# Patient Record
Sex: FEMALE
[De-identification: names, ages, dates, MRNs, and addresses within clinical notes are randomized per-mention and may not be internally consistent; named-entity substitution may affect disease eponyms.]

---

## 2020-08-20 ENCOUNTER — HOSPITAL ENCOUNTER (EMERGENCY)
Dept: HOSPITAL 88 - ER | Age: 70
Discharge: HOME | End: 2020-08-20
Payer: MEDICARE

## 2020-08-20 VITALS — HEIGHT: 62 IN | BODY MASS INDEX: 29.44 KG/M2 | WEIGHT: 160 LBS
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
## 2020-08-20 DIAGNOSIS — I73.00: ICD-10-CM

## 2020-08-20 DIAGNOSIS — S62.112A: Primary | ICD-10-CM
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
## 2020-08-20 PROCEDURE — 99283 EMERGENCY DEPT VISIT LOW MDM: CPT

## 2020-08-20 NOTE — DIAGNOSTIC IMAGING REPORT
Left wrist, 3 views



INDICATION: 

^wrist pain



Comparison: None available.



Discussion:  

There is focal soft tissue swelling about the dorsum of the wrist with a small

fracture fragment identified measuring approximately 5 mm in greatest

dimension. There is overlying soft tissue swelling. Negative for dislocation or

malalignment of the wrist joint. Mild narrowing of the radiocarpal joint space

is noted. Mild degenerative change of the first carpometacarpal joint is noted.

Negative for radiopaque foreign body within the overlying soft tissues.



IMPRESSION:



Small 5 mm fracture fragment within the dorsal aspect of the wrist is most

consistent with a triquetral chip fracture. Overlying soft tissue swelling is

noted.



Signed by: Adriel Ceron MD on 8/20/2020 12:55 PM

## 2020-08-20 NOTE — EMERGENCY DEPARTMENT NOTE
History of Present Illnes


History of Present Illness


Chief Complaint:  Extremity Trauma/Pain


History of Present Illness


This is a 69 year old  female s/p fall yesterday on L wrist.  .


Historian:  Patient


Arrival Mode:  Car


 Required:  No


Onset (how long ago):  day(s) (1)


Radiation:  Reports extremity


Severity:  mild


Onset quality:  sudden


Duration (how long):  day(s) (1)


Timing of current episode:  constant


Progression:  unchanged


Chronicity:  new


Context:  Reports trauma/injury


Relieving factors:  immobilization, rest


Exacerbating factors:  movement


Treatments prior to arrival:  none





Past Medical/Family History


Physician Review


I have reviewed the patient's past medical and family history.  Any updates have

been documented here.





Past Medical History


Recent Fever:  No


Clinical Suspicion of Infectio:  No


New/Unexplained Change in Ment:  No


Other Medical History:  


Raynauds Dz


Past Surgical History:  None





Social History


Smoking Cessation:  Never Smoker


Alcohol Use:  None


Any Illegal Drug Use:  No





Review of Systems


Review of Systems


Constitutional:  Reports no symptoms


EENTM:  Reports no symptoms


Cardiovascular:  Reports no symptoms


Respiratory:  Reports no symptoms


Gastrointestinal:  Reports no symptoms


Genitourinary:  Reports no symptoms


Musculoskeletal:  Reports joint pain


Integumentary:  Reports no symptoms


Neurological:  Reports no symptoms


Psychological:  Reports no symptoms


Endocrine:  Reports no symptoms


Hematological/Lymphatic:  Reports no symptoms





Physical Exam


Related Data


Allergies:  


Coded Allergies:  


     No Known Allergies (Unverified , 20)


Triage Vital Signs





Vital Signs








  Date Time  Temp Pulse Resp B/P (MAP) Pulse Ox O2 Delivery O2 Flow Rate FiO2


 


20 11:27 98.5 90 16 138/82 91 Room Air  








Vital signs reviewed:  Yes





Physical Exam


CONSTITUTIONAL





Constitutional:  Present well-developed, Present well-nourished


HENT


HENT:  Present normocephalic, Present atraumatic, Present oropharynx 

clear/moist, Present nose normal


HENT L/R:  Present left ext ear normal, Present right ext ear normal


EYES





Eyes:  Reports PERRL, Reports conjunctivae normal


NECK


Neck:  Present ROM normal


PULMONARY


Pulmonary:  Present effort normal, Present breath sounds normal


CARDIOVASCULAR





Cardiovascular:  Present regular rhythm, Present heart sounds normal, Present 

capillary refill normal, Present normal rate


GASTROINTESTINAL





Abdominal:  Present soft, Present nontender, Present bowel sounds normal


GENITOURINARY





Genitourinary:  Present exam deferred


SKIN


Skin:  Present warm, Present dry


MUSCULOSKELETAL





Musculoskeletal:  Present tenderness, Present other (left distal FA deformity. L

hand edema)


NEUROLOGICAL





Neurological:  Present alert, Present oriented x 3, Present no gross motor or 

sensory deficits


PSYCHOLOGICAL


Psychological:  Present mood/affect normal, Present judgement normal





Results


Imaging


Imaging results reviewed:  Yes


Impressions





[facility]


[address] [address]








Patient Name: DARÍO JOSEPH         MR #: [id number]      


: 1950   Age/Sex: 69/F


Acct #: [id number]   Req #: [id number]


Adm Physician:       


Ordered by: DARRON RAI DO      Report #: [id number]   


Location: ER      Room/Bed:    


________________________________________________

___________________________________________________





Procedure: [id number] DX/WRIST COMPLETE LEFT


Exam Date:                             Exam Time: 








REPORT STATUS: Signed





Left wrist, 3 views





INDICATION: 


^wrist pain





Comparison: None available.





Discussion:  


There is focal soft tissue swelling about the dorsum of the wrist with a small


fracture fragment identified measuring approximately 5 mm in greatest


dimension. There is overlying soft tissue swelling. Negative for dislocation or


malalignment of the wrist joint. Mild narrowing of the radiocarpal joint space


is noted. Mild degenerative change of the first carpometacarpal joint is noted.


Negative for radiopaque foreign body within the overlying soft tissues.





IMPRESSION:





Small 5 mm fracture fragment within the dorsal aspect of the wrist is most


consistent with a triquetral chip fracture. Overlying soft tissue swelling is


noted.





Signed by: Neri Ceron MD on 2020 12:55 PM








Dictated By: NERI CERON MD


Electronically Signed By: NERI CERON MD on 20 [id number]


Transcribed By: MILVIA on 20 125 








COPY TO:   DARRON RAI DO~





Assessment & Plan


Medical Decision Making


MDM


 Diff Dx : fx, dislocation, contusion, sprain, strain, osteomyelitis, cellul

itis, wound





Reassessment


Reassessment time:  13:43





Assessment & Plan


Final Impression:  


(1) Triquetral chip fracture


Last Vital Signs











  Date Time  Temp Pulse Resp B/P (MAP) Pulse Ox O2 Delivery O2 Flow Rate FiO2


 


20 11:27 98.5 90 16 138/82 91 Room Air  








Medications in the ED





Acetaminophen/ Hydrocodone Bitart 1 ea ONCE  ONCE PO  Last administered on 

20at 12:10; Admin Dose 1 EA;  Start 20 at 11:30;  Stop 20 at 

11:33;  Status DC











DARRON RAI DO                Aug 20, 2020 11:33